# Patient Record
Sex: FEMALE | Race: WHITE | NOT HISPANIC OR LATINO | Employment: UNEMPLOYED | URBAN - METROPOLITAN AREA
[De-identification: names, ages, dates, MRNs, and addresses within clinical notes are randomized per-mention and may not be internally consistent; named-entity substitution may affect disease eponyms.]

---

## 2021-07-09 ENCOUNTER — OFFICE VISIT (OUTPATIENT)
Dept: ORTHOPEDIC SURGERY | Facility: CLINIC | Age: 36
End: 2021-07-09

## 2021-07-09 VITALS
DIASTOLIC BLOOD PRESSURE: 69 MMHG | HEART RATE: 68 BPM | BODY MASS INDEX: 25.61 KG/M2 | SYSTOLIC BLOOD PRESSURE: 125 MMHG | WEIGHT: 150 LBS | HEIGHT: 64 IN

## 2021-07-09 DIAGNOSIS — M25.362 KNEE INSTABILITY, LEFT: ICD-10-CM

## 2021-07-09 DIAGNOSIS — S89.92XA INJURY OF LEFT KNEE, INITIAL ENCOUNTER: Primary | ICD-10-CM

## 2021-07-09 DIAGNOSIS — M25.562 ACUTE PAIN OF LEFT KNEE: ICD-10-CM

## 2021-07-09 PROCEDURE — 99204 OFFICE O/P NEW MOD 45 MIN: CPT | Performed by: ORTHOPAEDIC SURGERY

## 2021-07-09 RX ORDER — GABAPENTIN 600 MG/1
600 TABLET ORAL DAILY
COMMUNITY
End: 2021-09-22 | Stop reason: SINTOL

## 2021-07-09 NOTE — PROGRESS NOTES
General Exam    Patient: Oksana Hernandez    YOB: 1985    Medical Record Number: [id number]    Chief Complaints: Left knee pain    History of Present Illness:     36 y.o. female patient who presents for evaluation treatment of left knee pain.  Patient states 4 days ago fell after she stepped off while long boarding.  She states that she was going down a hill when she stepped off she is not sure but thinks she may have twisted her knee some she did not feel or hear a pop but did fall to the ground.  After which she did have some difficulty weightbearing.  Since injury she did have some swelling and pain which has improved some.  She states she has been on crutches and using a compression type brace which has helped.  She is able to weight-bear with just standing straight up but feels some instability and weakness when she steps forward.    Denies any numbness or tingling.  Denies any fevers, cough or shortness of breath.    Allergies:   Allergies   Allergen Reactions   • Penicillins Hives       Home Medications:      Current Outpatient Medications:   •  gabapentin (NEURONTIN) 600 MG tablet, Take 600 mg by mouth Daily., Disp: , Rfl:     No past medical history on file.    No past surgical history on file.    Social History     Occupational History   • Not on file   Tobacco Use   • Smoking status: Not on file   Substance and Sexual Activity   • Alcohol use: Not on file   • Drug use: Not on file   • Sexual activity: Not on file      Social History     Social History Narrative   • Not on file       No family history on file.    Review of Systems:      Constitutional: Denies fever, shaking or chills   Eyes: Denies change in visual acuity   HEENT: Denies nasal congestion or sore throat   Respiratory: Denies cough or shortness of breath   Cardiovascular: Denies chest pain or edema  Endocrine: Denies tremors, palpitations, intolerance of heat or cold, polyuria, polydipsia.  GI: Denies abdominal pain, nausea, vomiting,  "bloody stools or diarrhea  : Denies frequency, urgency, incontinence, retention, or nocturia.  Musculoskeletal: Denies numbness, tingling or loss of motor function except as above  Integument: Denies rash, lesion or ulceration   Neurologic: Denies headache or focal weakness, deficits  Heme: Denies spontaneous or excessive bleeding, epistaxis, hematuria, melena, fatigue, enlarged or tender lymph nodes.      All other pertinent positives and negatives as noted above in HPI.    Physical Exam: 36 y.o. female    Vitals:    07/09/21 0818   BP: 125/69   Pulse: 68   Weight: 68 kg (150 lb)   Height: 162.6 cm (64\")       General:  Patient is awake and alert.  Appears in no acute distress or discomfort.    Psych:  Affect and demeanor are appropriate.    Eyes:  Conjunctiva and sclera appear grossly normal.  Eyes track well and EOM seem to be intact.    Ears:  No gross abnormalities.  Hearing adequate for the exam.    Cardiovascular:  Regular rate and rhythm.    Lungs:  Good chest expansion.  Breathing unlabored.    Lymph:  No palpable masses or adenopathy in the affected extremity    Musculoskeletal/Extremities:    Left lower extremity: 2+ effusion compared to the contralateral side.  Minimal tenderness to palpation about the knee however with deeper palpation there is some tenderness along the quad tendon patella tendon and LCL.  I did not appreciate a overt palpable defect of the quad tendon.  Minimal tenderness along the joint line.  Patient can perform a straight leg raise and is able to extend the knee against some resistance but does have some discomfort anteriorly.  Knee range of motion 3-1 120+ near equal to contralateral side.  Possible 1+ laxity with varus stress of the knee and is noted some tenderness along the LCL region/lateral knee the palpation.  Negative Kathy's but overall difficult to fully access.  No appreciated posterior lag, some pain with Lachman exam but did appear to have an endpoint which was " consistent with contralateral side.  However given some of the pain and swelling absolute determination indeterminant.  Sensation tact is light touch calf soft compressible.  2+ pedal pulses.         Radiology:        3 views of the left knee were taken reviewed to evaluate the patient's complaint/s.    AP, lateral and sunrise view were taken.  Small effusion is noted.  On the AP view slight irregularity about the medial tibial spine difficult to discern if there is a real fracture line present there is no abnormalities on the lateral view.  Patella height appears appropriate.  Joint space maintained.     No imaging for comparison.    Assessment: Left knee pain with possible quad, LCL strain      Plan:      Given the patient's mechanism of injury, physical exam and imaging I do think an MRI is warranted to rule out any internal derangement or fracture.  I think this may be more of a strain possibly regards to the quad tendon LCL.  At this time I recommend continued use protective brace, compression, rest, ice, anti-inflammatories and protected weightbearing with crutches.  Once again MRI results we can make further recommendations at that time if no abnormalities are found likely some form physical therapy if needed.  Patient will call to schedule up a follow-up appointment when she knows when her MRI is scheduled for.          All questions were answered.  Patient understands and agrees with the plan.    Troy Snider MD    07/09/2021    CC to Provider, No Known

## 2021-07-29 ENCOUNTER — HOSPITAL ENCOUNTER (OUTPATIENT)
Dept: MRI IMAGING | Facility: HOSPITAL | Age: 36
Discharge: HOME | End: 2021-07-29
Admitting: ORTHOPAEDIC SURGERY

## 2021-07-29 ENCOUNTER — TELEPHONE (OUTPATIENT)
Dept: ORTHOPEDIC SURGERY | Facility: CLINIC | Age: 36
End: 2021-07-29

## 2021-07-29 DIAGNOSIS — M25.562 ACUTE PAIN OF LEFT KNEE: ICD-10-CM

## 2021-07-29 DIAGNOSIS — M25.362 KNEE INSTABILITY, LEFT: ICD-10-CM

## 2021-07-29 DIAGNOSIS — S89.92XA INJURY OF LEFT KNEE, INITIAL ENCOUNTER: ICD-10-CM

## 2021-07-29 PROCEDURE — 73721 MRI JNT OF LWR EXTRE W/O DYE: CPT

## 2021-07-30 ENCOUNTER — OFFICE VISIT (OUTPATIENT)
Dept: ORTHOPEDIC SURGERY | Facility: CLINIC | Age: 36
End: 2021-07-30

## 2021-07-30 VITALS
WEIGHT: 150 LBS | SYSTOLIC BLOOD PRESSURE: 109 MMHG | HEIGHT: 64 IN | HEART RATE: 96 BPM | BODY MASS INDEX: 25.61 KG/M2 | DIASTOLIC BLOOD PRESSURE: 75 MMHG

## 2021-07-30 DIAGNOSIS — S82.102D CLOSED FRACTURE OF PROXIMAL END OF LEFT TIBIA WITH ROUTINE HEALING, UNSPECIFIED FRACTURE MORPHOLOGY, SUBSEQUENT ENCOUNTER: Primary | ICD-10-CM

## 2021-07-30 PROCEDURE — 99213 OFFICE O/P EST LOW 20 MIN: CPT | Performed by: ORTHOPAEDIC SURGERY

## 2021-08-02 ENCOUNTER — TELEPHONE (OUTPATIENT)
Dept: ORTHOPEDIC SURGERY | Facility: CLINIC | Age: 36
End: 2021-08-02

## 2021-08-02 NOTE — TELEPHONE ENCOUNTER
Hey would you mind to assist on this please? I am not sure how to handle this one or even what to tell her. Thank you!

## 2021-08-02 NOTE — TELEPHONE ENCOUNTER
"Caller: RUDY CHEN    Relationship: SELF    Best call back number: [phone]; [phone] (PT'S SISTER'S # [id number], PT GAVE PERMISSION TO SPEAK WITH HER SISTER IF WE CANNOT GET A HOLD OF THE PATIENT AT THE FIRST NUMBER)    What form or medical record are you requesting: OFFICE NOTES, WORK LIMITATIONS    Who is requesting this form or medical record from you: PATIENT    How would you like to receive the form or medical records (pick-up, mail, fax): FAX  If fax, what is the fax number: [phone]    Timeframe paperwork needed: AS SOON AS POSSIBLE    Additional notes: PATIENT NEEDS INFORMATION TO GIVE TO A  FOR ISSUES REGARDING CUSTODY OF HER CHILDREN SO SHE CAN PROVE HER KNEE INJURY IS REAL AND IS NOT \"FAKING\" IT. PT WOULD ALSO LIKE TO SEE IF DR GREENWOOD COULD WRITE A LETTER STATING THAT HER INJURY IS A REAL CONDITION AS WELL. PLEASE CALL HER BACK IF MORE INFORMATION IS NEEDED.    "

## 2021-08-03 NOTE — PROGRESS NOTES
"Patient: Oskana Hernandez  YOB: 1985 36 y.o. female  Medical Record Number: [id number]    Chief Complaints:   Chief Complaint   Patient presents with   • Left Knee - Follow-up       History of Present Illness:Oksana Hernadnez is a 36 y.o. female who presents for follow-up of left knee pain.  Patient was seen several weeks ago and MRI was ordered due to concern for ligamentous versus fracture involving the knee/tibia.  MRI report did show nondisplaced proximal tibial plateau fracture.  No ligamentous injury noted.  Upon getting the report I did call and notify the patient to continue the nonweightbearing restrictions to the left lower extremity as were given at her last visit and to continue use of crutches.  Did instruct her to follow-up as I wanted to get her a new brace.  She comes in today with no crutches states she had left them at home.  She is very tearful as a life has been quite difficult for her at this time.  She states her knee pain has improved some and she has tried to adhere to restrictions.  No other complaints.    Allergies:   Allergies   Allergen Reactions   • Penicillins Hives       Medications:   Current Outpatient Medications   Medication Sig Dispense Refill   • gabapentin (NEURONTIN) 600 MG tablet Take 600 mg by mouth Daily.       No current facility-administered medications for this visit.         The following portions of the patient's history were reviewed and updated as appropriate: allergies, current medications, past family history, past medical history, past social history, past surgical history and problem list.    Review of Systems:   A 14 point review of systems was performed. All systems negative except pertinent positives/negative listed in HPI above    Physical Exam:   Vitals:    07/30/21 1034   BP: 109/75   Pulse: 96   Weight: 68 kg (150 lb)   Height: 162.6 cm (64\")       General: A and O x 3, ASA, NAD    SCLERA:    Normal    DENTITION:   Normal  Left lower extremity; minimal " tenderness palpation about the joint.  Minimal swelling noted decreased from when initially seen.  Knee range of motion 0-90.  Neurovascular tact distally    Radiology:   MRI left knee:     MRI KNEE LEFT  WO CONTRAST     Date of Exam: 7/29/2021 7:06 EDT     Indication: Left knee pain and instability status post skateboard injury in early July 2021.     Comparison: Radiographs 7/9/2021     Technique: Multiplanar multisequence images of the knee were performed according to routine knee MRI protocol.     FINDINGS:     Soft tissues: Small to moderate joint effusion with diffuse synovial thickening and/intra-articular debris. No popliteal cyst. No soft tissue mass.     Menisci: The medial meniscus is intact.  The lateral meniscus is intact.     Cruciate Ligaments: The ACL is intact.  The PCL is intact.     Collateral ligaments: The MCL is intact.  The LCL complex is intact.     Extensor Mechanism: The quadriceps tendon is intact.  The patellar tendon is intact.     Articular cartilage: The articular cartilage is intact in all 3 compartments.      Bones: Comminuted nondisplaced fracture of the proximal tibia involving the lateral columns and posterior medial column. Fracture lines extend through the base of the tibial spines and base of the tibial attachment of the PCL. Focal nondisplaced   subchondral fracture at the lateral weightbearing portion of the lateral femoral condyle. No concerning bone marrow lesions or marrow replacing process.     IMPRESSION:     1. Comminuted nondisplaced fracture of the proximal tibia.  2. Focal nondisplaced subchondral fracture at the lateral weightbearing portion of the lateral femoral condyle.  3. Small to moderate joint effusion with mild diffuse synovial thickening and/or intra-articular debris.  4. The menisci, cruciate ligaments, collateral ligaments, and articular cartilage are intact.      Assessment/Plan:  Left proximal tibia fracture      Recommend continued conservative therapy  at this time which we will continue her to nonweightbearing for least 6 to 8 weeks and in a protective T scope knee brace.  Ice, elevation, Tylenol for pain.  Recommend just 0 to 60 degrees of motion for the next week and then may be 0-90.  Recommend calcium and vitamin D to for bone healing/health.  Also patient does use some vape and cigarettes about half pack per day recommended cutting back to help with any bone healing as well.  Patient will follow up in 3 weeks at which point we may consider unlocking the T scope to 90 degrees with ambulation and starting some partial weightbearing for several weeks before allowing her to progress to full weightbearing depending on symptoms and imaging.  Patient states she will plan to follow-up however given her life situation she may return to [place] I told her that is fine we would be happy to provide any and all documentation and imaging is needed she can follow-up with orthopedics there.    Patient declined getting another set of crutches that she is has some at home.    All questions were answered patient understands agrees the plan.    Follow-up in 3 weeks with new x-rays of left knee.

## 2021-08-04 NOTE — TELEPHONE ENCOUNTER
"PATIENT CALLED BACK, INFORMED PATIENT I COULD FAX NOTE AND MRI REPORT TO HER. PATIENT WOULD LIKE DR GREENWOOD TO TYPE A LETTER STATING WHAT IS WRONG WITH PATIENT AND WHAT RESTRICTIONS SHE HAD AND THAT SHE \"IS NOT FAKING\" THIS INJURY.PLEASE ADVISE IF YOU WILL DO THIS OR NOT DR. GREENWOOD   "

## 2021-08-19 NOTE — TELEPHONE ENCOUNTER
Spoke with patient she has an apt tomorrow and will  the letter Dr Snider signed at that apt. Placed up front in his folder

## 2021-08-20 ENCOUNTER — OFFICE VISIT (OUTPATIENT)
Dept: ORTHOPEDIC SURGERY | Facility: CLINIC | Age: 36
End: 2021-08-20

## 2021-08-20 VITALS
DIASTOLIC BLOOD PRESSURE: 69 MMHG | WEIGHT: 150 LBS | HEIGHT: 64 IN | BODY MASS INDEX: 25.61 KG/M2 | SYSTOLIC BLOOD PRESSURE: 108 MMHG | HEART RATE: 54 BPM

## 2021-08-20 DIAGNOSIS — S82.102D CLOSED FRACTURE OF PROXIMAL END OF LEFT TIBIA WITH ROUTINE HEALING, UNSPECIFIED FRACTURE MORPHOLOGY, SUBSEQUENT ENCOUNTER: Primary | ICD-10-CM

## 2021-08-20 DIAGNOSIS — M25.562 LEFT KNEE PAIN, UNSPECIFIED CHRONICITY: ICD-10-CM

## 2021-08-20 PROCEDURE — 99212 OFFICE O/P EST SF 10 MIN: CPT | Performed by: ORTHOPAEDIC SURGERY

## 2021-08-20 NOTE — PROGRESS NOTES
"Patient: Oksana Hernandez  YOB: 1985 36 y.o. female  Medical Record Number: [id number]    Chief Complaints:   Chief Complaint   Patient presents with   • Left Knee - Pain, Follow-up       History of Present Illness:Oksana Hernandez is a 36 y.o. female who presents for follow-up of 6-week status post nondisplaced tibial plateau and small lateral femoral condyle fracture.  Patient states she has been adhering to instructions/restrictions of weightbearing for the most part but has been walking some.  She has been really try to use her knee immobilizer and stay off of is that she can use in her crutches.  She feels like pain and swelling has improved.    Allergies:   Allergies   Allergen Reactions   • Penicillins Hives       Medications:   Current Outpatient Medications   Medication Sig Dispense Refill   • gabapentin (NEURONTIN) 600 MG tablet Take 600 mg by mouth Daily.       No current facility-administered medications for this visit.         The following portions of the patient's history were reviewed and updated as appropriate: allergies, current medications, past family history, past medical history, past social history, past surgical history and problem list.    Review of Systems:   A 14 point review of systems was performed. All systems negative except pertinent positives/negative listed in HPI above    Physical Exam:   Vitals:    08/20/21 1017   BP: 108/69   Pulse: 54   Weight: 68 kg (150 lb)   Height: 162.6 cm (64\")       General: A and O x 3, ASA, NAD    SCLERA:    Normal    DENTITION:   Normal  Left lower extremity:    Minimal swelling about the knee.  No tenderness palpation.  Knee range of motion 0-90.  Passively to 110+ but a little soreness/pain.  Neurovascular tact distally.  Calf soft compressible.    Radiology:    2 views of the left knee were taken reviewed evaluate patient.  Imaging shows some increased healing in the proximal tibia and distal femur with increased density compared to prior imaging.  " Soft tissues appear within normal limits.    Assessment/Plan:  6 weeks status post left proximal tibia fracture, left lateral femoral condyle fracture.      Continue conservative management plan to order physical therapy    Instructions:Patient should be 50% partial weightbearing for the next 2 weeks with her crutches and knee immobilizer unlocked 0 to 90 degrees.  Starting week 3 if quad strength sufficient may completely unlock knee immobilizer and patient may be 75% partial weightbearing crutches.  Week for patient may begin to wean out of knee immobilizer and be 100% weightbearing as tolerated with crutches weaning from both then to 1 and then to none over the next few weeks.  Patient will follow up with me in 4 weeks.  Please call me with any question or concerns thank you    Okay for ankle pumps, gentle quad strengthening while knee is in extension and at rest she can tighten relax set the tendon as instructed and shown.  Continue Tylenol for pain, no anti-inflammatories if can avoid.  Ice as needed.  Continue calcium vitamin D for bone health.    Follow-up in 4 weeks.  Call any questions or concerns.

## 2021-09-17 ENCOUNTER — OFFICE VISIT (OUTPATIENT)
Dept: ORTHOPEDIC SURGERY | Facility: CLINIC | Age: 36
End: 2021-09-17

## 2021-09-17 VITALS
BODY MASS INDEX: 22.71 KG/M2 | DIASTOLIC BLOOD PRESSURE: 75 MMHG | WEIGHT: 133 LBS | HEIGHT: 64 IN | HEART RATE: 87 BPM | SYSTOLIC BLOOD PRESSURE: 115 MMHG

## 2021-09-17 DIAGNOSIS — M25.562 LEFT KNEE PAIN, UNSPECIFIED CHRONICITY: ICD-10-CM

## 2021-09-17 DIAGNOSIS — S82.102D CLOSED FRACTURE OF PROXIMAL END OF LEFT TIBIA WITH ROUTINE HEALING, UNSPECIFIED FRACTURE MORPHOLOGY, SUBSEQUENT ENCOUNTER: Primary | ICD-10-CM

## 2021-09-17 PROCEDURE — 99212 OFFICE O/P EST SF 10 MIN: CPT | Performed by: ORTHOPAEDIC SURGERY

## 2021-09-17 RX ORDER — PHENOL 1.4 %
600 AEROSOL, SPRAY (ML) MUCOUS MEMBRANE DAILY
COMMUNITY

## 2021-09-17 RX ORDER — MULTIPLE VITAMINS W/ MINERALS TAB 9MG-400MCG
1 TAB ORAL DAILY
COMMUNITY

## 2021-09-17 RX ORDER — ACETAMINOPHEN 325 MG/1
650 TABLET ORAL EVERY 6 HOURS PRN
COMMUNITY

## 2021-09-17 NOTE — PROGRESS NOTES
Patient: Oksana Hernandez  YOB: 1985 36 y.o. female  Medical Record Number: [id number]    Chief Complaints:   Chief Complaint   Patient presents with   • Left Knee - Follow-up       History of Present Illness:Oksana Hernandez is a 36 y.o. female who presents for follow-up of 10 weeks out now from nondisplaced tibial plateau fracture and small lateral femoral condyle fracture.  Patient states she has been doing some therapy however she has not been adhering to her restrictions and states therapy is yelled at her for this.  However she states she does not have pain and range of motion is greatly improved.  She feels like she is doing quite well.  States she already started weaning out of her brace while at home but wears it when out.  She shows up today with the brace on but no crutches.  She has no other complaints    Allergies:   Allergies   Allergen Reactions   • Penicillins Hives       Medications:   Current Outpatient Medications   Medication Sig Dispense Refill   • acetaminophen (TYLENOL) 325 MG tablet Take 650 mg by mouth Every 6 (Six) Hours As Needed for Mild Pain .     • calcium carbonate (OS-PRAVEENA) 600 MG tablet Take 600 mg by mouth Daily.     • multivitamin with minerals tablet tablet Take 1 tablet by mouth Daily.     • vitamin D3 125 MCG (5000 UT) capsule capsule Take 5,000 Units by mouth Daily.     • gabapentin (NEURONTIN) 600 MG tablet Take 600 mg by mouth Daily.       No current facility-administered medications for this visit.         The following portions of the patient's history were reviewed and updated as appropriate: allergies, current medications, past family history, past medical history, past social history, past surgical history and problem list.    Review of Systems:   A 14 point review of systems was performed. All systems negative except pertinent positives/negative listed in HPI above    Physical Exam:   Vitals:    09/17/21 1009   BP: 115/75   Pulse: 87   Weight: 60.3 kg (133 lb)   Height:  "162.6 cm (64\")       General: A and O x 3, ASA, NAD    SCLERA:    Normal    DENTITION:   Normal  Left lower extremity: Full knee range of motion no overt tenderness to palpation.  Patient same seated position and has slightly altered gait likely due to the brace but demonstrates no pain with ambulation.    Radiology:     Left knee AP and lateral were taken reviewed evaluate patient's complaints.  Imaging shows evidence of healing of her tibial plateau and lateral femoral condyle.  No other significant changes compared to prior films.    Assessment/Plan:  10 weeks status post left proximal tibia fracture, left lateral femoral condyle fracture.    Patient is progressing well despite adhering to all the restrictions given.  At this point I do feel that she may progress to full weightbearing as tolerated and wean out of her brace over the next couple weeks.  We will go ahead and schedule follow-up for 4 weeks if she is doing well and having no issues she may cancel the appointment.  All questions were answered.  She understands agrees the plan.    "

## 2021-09-22 ENCOUNTER — OFFICE VISIT (OUTPATIENT)
Dept: FAMILY MEDICINE CLINIC | Facility: CLINIC | Age: 36
End: 2021-09-22

## 2021-09-22 VITALS
HEIGHT: 63 IN | BODY MASS INDEX: 23.57 KG/M2 | WEIGHT: 133 LBS | OXYGEN SATURATION: 100 % | SYSTOLIC BLOOD PRESSURE: 100 MMHG | TEMPERATURE: 98 F | DIASTOLIC BLOOD PRESSURE: 64 MMHG | HEART RATE: 65 BPM

## 2021-09-22 DIAGNOSIS — F12.90 MARIJUANA SMOKER: ICD-10-CM

## 2021-09-22 DIAGNOSIS — G89.29 CHRONIC PAIN OF BOTH KNEES: ICD-10-CM

## 2021-09-22 DIAGNOSIS — R11.0 NAUSEA: ICD-10-CM

## 2021-09-22 DIAGNOSIS — K52.9 CHRONIC DIARRHEA: ICD-10-CM

## 2021-09-22 DIAGNOSIS — M25.561 CHRONIC PAIN OF BOTH KNEES: ICD-10-CM

## 2021-09-22 DIAGNOSIS — M25.562 CHRONIC PAIN OF BOTH KNEES: ICD-10-CM

## 2021-09-22 DIAGNOSIS — B18.2 CHRONIC HEPATITIS C WITHOUT HEPATIC COMA (HCC): Primary | ICD-10-CM

## 2021-09-22 DIAGNOSIS — F39 MOOD DISORDER (HCC): ICD-10-CM

## 2021-09-22 DIAGNOSIS — F17.200 SMOKER: ICD-10-CM

## 2021-09-22 DIAGNOSIS — E55.9 VITAMIN D DEFICIENCY: ICD-10-CM

## 2021-09-22 DIAGNOSIS — G62.9 NEUROPATHY: ICD-10-CM

## 2021-09-22 DIAGNOSIS — E78.5 DYSLIPIDEMIA: ICD-10-CM

## 2021-09-22 DIAGNOSIS — Z12.4 PAP SMEAR FOR CERVICAL CANCER SCREENING: ICD-10-CM

## 2021-09-22 LAB
25(OH)D3 SERPL-MCNC: 43.6 NG/ML
ALBUMIN SERPL-MCNC: 4.6 G/DL (ref 3.5–5.2)
ALBUMIN/GLOB SERPL: 1.3 G/DL
ALP SERPL-CCNC: 70 U/L (ref 39–117)
ALT SERPL W P-5'-P-CCNC: 41 U/L (ref 1–33)
ANION GAP SERPL CALCULATED.3IONS-SCNC: 7.4 MMOL/L (ref 5–15)
AST SERPL-CCNC: 33 U/L (ref 1–32)
BASOPHILS # BLD AUTO: 0.04 10*3/MM3 (ref 0–0.2)
BASOPHILS NFR BLD AUTO: 0.4 % (ref 0–1.5)
BILIRUB SERPL-MCNC: 0.3 MG/DL (ref 0–1.2)
BUN SERPL-MCNC: 7 MG/DL (ref 6–20)
BUN/CREAT SERPL: 12.3 (ref 7–25)
CALCIUM SPEC-SCNC: 9.2 MG/DL (ref 8.6–10.5)
CHLORIDE SERPL-SCNC: 101 MMOL/L (ref 98–107)
CHOLEST SERPL-MCNC: 168 MG/DL (ref 0–200)
CO2 SERPL-SCNC: 27.6 MMOL/L (ref 22–29)
CREAT SERPL-MCNC: 0.57 MG/DL (ref 0.57–1)
DEPRECATED RDW RBC AUTO: 45.1 FL (ref 37–54)
EOSINOPHIL # BLD AUTO: 0.16 10*3/MM3 (ref 0–0.4)
EOSINOPHIL NFR BLD AUTO: 1.7 % (ref 0.3–6.2)
ERYTHROCYTE [DISTWIDTH] IN BLOOD BY AUTOMATED COUNT: 12.7 % (ref 12.3–15.4)
GFR SERPL CREATININE-BSD FRML MDRD: 119 ML/MIN/1.73
GLOBULIN UR ELPH-MCNC: 3.5 GM/DL
GLUCOSE SERPL-MCNC: 83 MG/DL (ref 65–99)
HCT VFR BLD AUTO: 46 % (ref 34–46.6)
HDLC SERPL-MCNC: 57 MG/DL (ref 40–60)
HGB BLD-MCNC: 14.7 G/DL (ref 12–15.9)
IMM GRANULOCYTES # BLD AUTO: 0.03 10*3/MM3 (ref 0–0.05)
IMM GRANULOCYTES NFR BLD AUTO: 0.3 % (ref 0–0.5)
LDLC SERPL CALC-MCNC: 96 MG/DL (ref 0–100)
LDLC/HDLC SERPL: 1.66 {RATIO}
LYMPHOCYTES # BLD AUTO: 1.88 10*3/MM3 (ref 0.7–3.1)
LYMPHOCYTES NFR BLD AUTO: 20.3 % (ref 19.6–45.3)
MCH RBC QN AUTO: 31.1 PG (ref 26.6–33)
MCHC RBC AUTO-ENTMCNC: 32 G/DL (ref 31.5–35.7)
MCV RBC AUTO: 97.3 FL (ref 79–97)
MONOCYTES # BLD AUTO: 0.52 10*3/MM3 (ref 0.1–0.9)
MONOCYTES NFR BLD AUTO: 5.6 % (ref 5–12)
NEUTROPHILS NFR BLD AUTO: 6.61 10*3/MM3 (ref 1.7–7)
NEUTROPHILS NFR BLD AUTO: 71.7 % (ref 42.7–76)
NRBC BLD AUTO-RTO: 0 /100 WBC (ref 0–0.2)
PLATELET # BLD AUTO: 235 10*3/MM3 (ref 140–450)
PMV BLD AUTO: 11.9 FL (ref 6–12)
POTASSIUM SERPL-SCNC: 3.9 MMOL/L (ref 3.5–5.2)
PROT SERPL-MCNC: 8.1 G/DL (ref 6–8.5)
RBC # BLD AUTO: 4.73 10*6/MM3 (ref 3.77–5.28)
SODIUM SERPL-SCNC: 136 MMOL/L (ref 136–145)
T4 FREE SERPL-MCNC: 1.66 NG/DL (ref 0.93–1.7)
TRIGL SERPL-MCNC: 82 MG/DL (ref 0–150)
TSH SERPL DL<=0.05 MIU/L-ACNC: 0.71 UIU/ML (ref 0.27–4.2)
VLDLC SERPL-MCNC: 15 MG/DL (ref 5–40)
WBC # BLD AUTO: 9.24 10*3/MM3 (ref 3.4–10.8)

## 2021-09-22 PROCEDURE — 82306 VITAMIN D 25 HYDROXY: CPT | Performed by: FAMILY MEDICINE

## 2021-09-22 PROCEDURE — 80050 GENERAL HEALTH PANEL: CPT | Performed by: FAMILY MEDICINE

## 2021-09-22 PROCEDURE — 88175 CYTOPATH C/V AUTO FLUID REDO: CPT | Performed by: FAMILY MEDICINE

## 2021-09-22 PROCEDURE — 87491 CHLMYD TRACH DNA AMP PROBE: CPT | Performed by: FAMILY MEDICINE

## 2021-09-22 PROCEDURE — 84439 ASSAY OF FREE THYROXINE: CPT | Performed by: FAMILY MEDICINE

## 2021-09-22 PROCEDURE — 99395 PREV VISIT EST AGE 18-39: CPT | Performed by: FAMILY MEDICINE

## 2021-09-22 PROCEDURE — 80061 LIPID PANEL: CPT | Performed by: FAMILY MEDICINE

## 2021-09-22 PROCEDURE — 87591 N.GONORRHOEAE DNA AMP PROB: CPT | Performed by: FAMILY MEDICINE

## 2021-09-22 RX ORDER — GABAPENTIN ENACARBIL 300 MG/1
1 TABLET, EXTENDED RELEASE ORAL DAILY
Qty: 10 TABLET | Refills: 0 | COMMUNITY
Start: 2021-09-22

## 2021-09-22 NOTE — PROGRESS NOTES
Venipuncture Blood Specimen Collection  Venipuncture performed in [facility] by Miguel Ángel Bauer MA with good hemostasis. Patient tolerated the procedure well without complications.   09/22/21   Miguel Ángel Bauer MA

## 2021-09-22 NOTE — PROGRESS NOTES
"    Subjective   Oksana Hernandez is a 36 y.o. female.     Chief Complaint   Patient presents with   • Annual Exam   • Gynecologic Exam   • Establish Care       History of Present Illness   New to this practice  Pt lives in [place]  No PCP sara 6 years, needs pap  Pt wa recently seen by othro for  Tibial fractureand  hand surgeon for,laceration in left hand, now has pins and needles in left  Hand took her 's gabapentin which did help  Has had diarrhea 6-8 x day for 4 years, maternal GM, father had colon CA  Diagnosed with hep c 11 years ago, needs referral to GI  V/o left knee pain, right leg \"burning\"  SMOKES MARIJUANA DAILY,  STOPPED HEAVIER DRUGS 6 YEARS  Current medical leave from a a [employer]    The following portions of the patient's history were reviewed and updated as appropriate: allergies, current medications, past family history, past medical history, past social history, past surgical history and problem list.    Past Medical History:   Diagnosis Date   • Allergic    • Arthritis    • Hepatitis     hep c   • Hepatitis C    • Irritable bowel syndrome        Past Surgical History:   Procedure Laterality Date   • HAND SURGERY Left    • HAND TENDON SURGERY      left   • LAPAROSCOPIC TUBAL LIGATION     • TUBAL ABDOMINAL LIGATION     • WISDOM TOOTH EXTRACTION         Family History   Problem Relation Age of Onset   • Diabetes Mother    • Cancer Father    • Cancer Maternal Grandmother        Review of Systems   Constitutional: Negative for fatigue, unexpected weight gain and unexpected weight loss.   HENT: Negative for congestion, sinus pressure and sore throat.         Normal smell/taste   Eyes: Negative for blurred vision and visual disturbance.   Respiratory: Negative for cough, shortness of breath and wheezing.    Cardiovascular: Negative for chest pain, palpitations and leg swelling.   Gastrointestinal: Negative for abdominal pain, constipation, diarrhea, nausea, vomiting, GERD and indigestion. "   Musculoskeletal: Positive for arthralgias. Negative for back pain, gait problem, joint swelling and neck pain.        Left hand numbness  Bilateral leg pain   Skin: Negative for rash, skin lesions and bruise.   Allergic/Immunologic: Negative for environmental allergies.   Neurological: Negative for dizziness, tremors, syncope, weakness, light-headedness, headache and memory problem.   Psychiatric/Behavioral: Negative for sleep disturbance, depressed mood and stress. The patient is not nervous/anxious.        Objective   Physical Exam  Vitals and nursing note reviewed.   Constitutional:       General: She is not in acute distress.     Appearance: She is well-developed. She is not diaphoretic.   HENT:      Head: Normocephalic and atraumatic.      Right Ear: External ear normal.      Left Ear: External ear normal.      Nose: Nose normal.   Eyes:      Conjunctiva/sclera: Conjunctivae normal.      Pupils: Pupils are equal, round, and reactive to light.   Neck:      Thyroid: No thyromegaly.   Cardiovascular:      Rate and Rhythm: Normal rate and regular rhythm.      Heart sounds: Normal heart sounds. No murmur heard.   No friction rub. No gallop.    Pulmonary:      Effort: Pulmonary effort is normal.      Breath sounds: Normal breath sounds. No wheezing.   Abdominal:      General: Bowel sounds are normal.      Palpations: Abdomen is soft.      Tenderness: There is no abdominal tenderness.   Genitourinary:     Comments: Normal pelvic exam with pap  Musculoskeletal:         General: No tenderness or deformity. Normal range of motion.      Cervical back: Normal range of motion and neck supple.   Lymphadenopathy:      Cervical: No cervical adenopathy.   Skin:     General: Skin is warm and dry.      Capillary Refill: Capillary refill takes less than 2 seconds.      Findings: No rash.   Neurological:      Mental Status: She is alert and oriented to person, place, and time.      Cranial Nerves: No cranial nerve deficit.    Psychiatric:         Behavior: Behavior normal.         Thought Content: Thought content normal.         Judgment: Judgment normal.         Vitals:    09/22/21 1257   BP: 100/64   Pulse: 65   Temp: 98 °F (36.7 °C)   SpO2: 100%     Body mass index is 23.56 kg/m².    No results found for: HGBA1C, POCGLU, LDL, CBC, CMP, TSH  No results found for this or any previous visit.    Assessment/Plan   Diagnoses and all orders for this visit:    1. Chronic hepatitis C without hepatic coma (CMS/HCC) (Primary)  -     Ambulatory Referral to Gastroenterology  -     CBC & Differential  -     Comprehensive metabolic panel; Future  -     Comprehensive metabolic panel    2. Chronic diarrhea    3. Nausea    4. Smoker    5. Mood disorder (CMS/HCC)  -     TSH+Free T4    6. Marijuana smoker    7. Dyslipidemia  -     Lipid Panel; Future  -     Lipid Panel    8. Vitamin D deficiency  -     Vitamin D 25 hydroxy; Future  -     Vitamin D 25 hydroxy    9. Neuropathy  -     Gabapentin Enacarbil ER (Horizant) 300 MG tablet controlled-release; Take 1 tablet by mouth Daily.  Dispense: 10 tablet; Refill: 0    10. Chronic pain of both knees  -     Ambulatory Referral to Orthopedic Surgery    11. Pap smear for cervical cancer screening  -     IGP,CtNg,rfx Apt HPV All Pth; Future  -     IGP,CtNg,rfx Apt HPV All Pth        Fu labs  Trial horizant 200 mg daily or neuropathy  Keep appt with ortho  Refer gi for chronic diarrhea, Hep C

## 2021-09-24 LAB
C TRACH RRNA CVX QL NAA+PROBE: NEGATIVE
CONV .: NORMAL
CYTOLOGIST CVX/VAG CYTO: NORMAL
CYTOLOGY CVX/VAG DOC CYTO: NORMAL
CYTOLOGY CVX/VAG DOC THIN PREP: NORMAL
DX ICD CODE: NORMAL
HIV 1 & 2 AB SER-IMP: NORMAL
N GONORRHOEA RRNA CVX QL NAA+PROBE: NEGATIVE
OTHER STN SPEC: NORMAL
STAT OF ADQ CVX/VAG CYTO-IMP: NORMAL

## 2021-10-22 ENCOUNTER — TELEPHONE (OUTPATIENT)
Dept: FAMILY MEDICINE CLINIC | Facility: CLINIC | Age: 36
End: 2021-10-22

## 2021-10-22 NOTE — TELEPHONE ENCOUNTER
Hub is instructed to read the documentation below to patient  Attempted call, no answer, mail box full. Pap showed some inflammation, normal otherwise.  Please schedule a follow up appt to discuss other labs

## 2021-10-22 NOTE — TELEPHONE ENCOUNTER
----- Message from Herminia Cazares MD sent at 10/20/2021  3:37 PM EDT -----  Psp showed some inflammation , other wise normal.  Pls reschedule missed appt

## 2021-11-01 ENCOUNTER — TELEPHONE (OUTPATIENT)
Dept: FAMILY MEDICINE CLINIC | Facility: CLINIC | Age: 36
End: 2021-11-01

## 2021-11-07 DIAGNOSIS — R20.2 NUMBNESS AND TINGLING OF BOTH LEGS: Primary | ICD-10-CM

## 2021-11-07 DIAGNOSIS — R20.0 NUMBNESS AND TINGLING OF BOTH LEGS: Primary | ICD-10-CM
